# Patient Record
Sex: FEMALE | Race: WHITE | URBAN - METROPOLITAN AREA
[De-identification: names, ages, dates, MRNs, and addresses within clinical notes are randomized per-mention and may not be internally consistent; named-entity substitution may affect disease eponyms.]

---

## 2020-01-06 ENCOUNTER — Encounter (OUTPATIENT)
Dept: URBAN - METROPOLITAN AREA CLINIC 72 | Facility: CLINIC | Age: 55
End: 2020-01-06
Payer: COMMERCIAL

## 2020-01-06 PROCEDURE — 99204 OFFICE O/P NEW MOD 45 MIN: CPT | Performed by: OPTOMETRIST

## 2020-01-09 ENCOUNTER — Encounter (OUTPATIENT)
Dept: URBAN - METROPOLITAN AREA CLINIC 72 | Facility: CLINIC | Age: 55
End: 2020-01-09
Payer: COMMERCIAL

## 2020-01-09 PROCEDURE — 92012 INTRM OPH EXAM EST PATIENT: CPT | Performed by: OPTOMETRIST

## 2020-01-30 ENCOUNTER — Encounter (OUTPATIENT)
Dept: URBAN - METROPOLITAN AREA CLINIC 71 | Facility: CLINIC | Age: 55
End: 2020-01-30
Payer: COMMERCIAL

## 2020-01-30 PROCEDURE — 99214 OFFICE O/P EST MOD 30 MIN: CPT | Performed by: OPHTHALMOLOGY

## 2020-01-30 PROCEDURE — 92081 LIMITED VISUAL FIELD XM: CPT | Performed by: OPHTHALMOLOGY

## 2020-02-03 ENCOUNTER — Encounter (OUTPATIENT)
Dept: URBAN - METROPOLITAN AREA CLINIC 71 | Facility: CLINIC | Age: 55
End: 2020-02-03
Payer: COMMERCIAL

## 2020-02-03 PROCEDURE — 92081 LIMITED VISUAL FIELD XM: CPT | Performed by: OPHTHALMOLOGY

## 2020-04-23 ENCOUNTER — PRE-OPERATIVE VISIT (OUTPATIENT)
Dept: URBAN - METROPOLITAN AREA CLINIC 71 | Facility: CLINIC | Age: 55
End: 2020-04-23
Payer: COMMERCIAL

## 2020-04-23 DIAGNOSIS — H02.831 DERMATOCHALASIS OF RIGHT UPPER EYELID: Primary | ICD-10-CM

## 2020-04-23 DIAGNOSIS — H02.834 DERMATOCHALASIS OF LEFT UPPER EYELID: ICD-10-CM

## 2020-04-23 PROCEDURE — 92012 INTRM OPH EXAM EST PATIENT: CPT | Performed by: OPHTHALMOLOGY

## 2020-04-23 NOTE — IMPRESSION/PLAN
Impression: Dermatochalasis of right upper eyelid: H02.831. Plan: Discussed diagnosis in detail with patient. New medication(s) Rx given today. Surgical treatment is required. Patient elects to have surgery. Surgical risks and benefits were discussed, explained and understood by patient. Pre op instructions given and understood. Post op instructions given and understood. 
PROCEED WITH BLEPHAROPLASTY OU UL, XANAX, ERYTHROMYCIN OINT

## 2020-04-23 NOTE — IMPRESSION/PLAN
Impression: Dermatochalasis of left upper eyelid: H02.834. Plan: Discussed diagnosis in detail with patient. Surgical treatment is required. Patient elects to have surgery. Surgical risks and benefits were discussed, explained and understood by patient. Pre op instructions given and understood. Post op instructions given and understood. New medication(s) Rx given today. PROCEED WITH BLEPHAROPLSTY OU UL, XANAX, ERYTHROMYCIN OINT.

## 2020-05-19 ENCOUNTER — SURGERY (OUTPATIENT)
Dept: URBAN - METROPOLITAN AREA SURGERY 45 | Facility: SURGERY | Age: 55
End: 2020-05-19
Payer: COMMERCIAL

## 2020-05-19 ENCOUNTER — SURGERY (OUTPATIENT)
Dept: URBAN - METROPOLITAN AREA SURGERY 44 | Facility: SURGERY | Age: 55
End: 2020-05-19
Payer: COMMERCIAL

## 2020-05-27 ENCOUNTER — POST-OPERATIVE VISIT (OUTPATIENT)
Dept: URBAN - METROPOLITAN AREA CLINIC 71 | Facility: CLINIC | Age: 55
End: 2020-05-27

## 2020-05-27 DIAGNOSIS — Z98.890 OTHER SPECIFIED POSTPROCEDURAL STATES: Primary | ICD-10-CM

## 2020-05-27 PROCEDURE — 99024 POSTOP FOLLOW-UP VISIT: CPT | Performed by: OPHTHALMOLOGY

## 2020-05-27 ASSESSMENT — INTRAOCULAR PRESSURE
OD: 18
OS: 17

## 2020-05-27 NOTE — IMPRESSION/PLAN
Impression: S/P Belpharoplasty OU - 8 Days. Other specified postprocedural states  Z98.890. Minimal swelling Incision looks good. Healing well. Sutures removed today OU. Patient okay to d/c ointment. No hard rubbing for another week.  Plan:

## 2020-05-29 ENCOUNTER — POST-OPERATIVE VISIT (OUTPATIENT)
Dept: URBAN - METROPOLITAN AREA CLINIC 71 | Facility: CLINIC | Age: 55
End: 2020-05-29

## 2020-05-29 PROCEDURE — 99024 POSTOP FOLLOW-UP VISIT: CPT | Performed by: OPTOMETRIST

## 2020-05-29 ASSESSMENT — INTRAOCULAR PRESSURE
OS: 17
OD: 21

## 2020-05-29 NOTE — IMPRESSION/PLAN
Impression: Other specified postprocedural states: Z98.890.  Plan: Discussed diagnosis in detail with patient. looks good, pt is cleared to use vitamin E To improve the scar

## 2020-09-17 ENCOUNTER — OFFICE VISIT (OUTPATIENT)
Dept: URBAN - METROPOLITAN AREA CLINIC 71 | Facility: CLINIC | Age: 55
End: 2020-09-17
Payer: COMMERCIAL

## 2020-09-17 PROCEDURE — 99212 OFFICE O/P EST SF 10 MIN: CPT | Performed by: OPHTHALMOLOGY

## 2020-09-17 ASSESSMENT — INTRAOCULAR PRESSURE
OS: 15
OD: 16

## 2020-09-17 ASSESSMENT — KERATOMETRY
OS: 42.63
OD: 43.00

## 2020-09-17 NOTE — IMPRESSION/PLAN
Impression: Dermatochalasis of right upper eyelid: H02.831. S/P blepharoplasty. Plan: Pt informed that the purpose of the surgery is to improve the vision and some asymmetry may occur. Pt advised that a steroid injection may help the bumps resolve, but they should go away on their own. Pt advised of the HOF around the eyes and that this would be a cosmetic procedure.  Pt will wait to see if they resolve at this time, pt advised that if after about 12 months, they have not resolved, steroid injections can be an option

## 2021-07-01 ENCOUNTER — OFFICE VISIT (OUTPATIENT)
Dept: URBAN - METROPOLITAN AREA CLINIC 71 | Facility: CLINIC | Age: 56
End: 2021-07-01
Payer: COMMERCIAL

## 2021-07-01 DIAGNOSIS — H04.123 DRY EYE SYNDROME OF BILATERAL LACRIMAL GLANDS: ICD-10-CM

## 2021-07-01 PROCEDURE — 68761 CLOSE TEAR DUCT OPENING: CPT | Performed by: OPHTHALMOLOGY

## 2021-07-01 PROCEDURE — 99213 OFFICE O/P EST LOW 20 MIN: CPT | Performed by: OPHTHALMOLOGY

## 2021-07-01 ASSESSMENT — INTRAOCULAR PRESSURE
OS: 14
OD: 14

## 2021-07-01 NOTE — IMPRESSION/PLAN
Impression: Dermatochalasis of left upper eyelid: H02.834. Plan: residual fullness at far temporal edge of scars OU upper lid. Will make a small elliptical incision OU in clinic with a small stitch closer. Will RTC N/A for In-office procedure.

## 2021-07-01 NOTE — IMPRESSION/PLAN
Impression: Dry eye syndrome of bilateral lacrimal glands: H04.123. Plan: PT reports consistent gritty and montserrat feeling in the eyes. She has constant dry mouth. She reports she has a hard time swallowing the middle of eggs. Recommend PT have a Sjogren's panel done to rule out possible Sjogren's. Punctal plugs discussed with PT. PT consents to plugs and no complications during procedure.  
SOFTPLUG 0.4mm- [id number] OU

## 2021-09-09 ENCOUNTER — PROCEDURE (OUTPATIENT)
Dept: URBAN - METROPOLITAN AREA CLINIC 71 | Facility: CLINIC | Age: 56
End: 2021-09-09
Payer: COMMERCIAL

## 2021-09-09 DIAGNOSIS — D23.111 OTHER BENIGN NEOPLASM SKIN/ RIGHT UPPER EYELID, INC CANTHUS: Primary | ICD-10-CM

## 2021-09-09 DIAGNOSIS — D23.121 OTHER BENIGN NEOPLASM SKIN/ LEFT UPPER EYELID, INC CANTHUS: ICD-10-CM

## 2021-09-09 PROCEDURE — 11440 EXC FACE-MM B9+MARG 0.5 CM/<: CPT | Performed by: OPHTHALMOLOGY

## 2021-09-09 ASSESSMENT — INTRAOCULAR PRESSURE
OD: 18
OS: 14

## 2021-09-09 NOTE — IMPRESSION/PLAN
Impression: Other benign neoplasm skin/ right upper eyelid, inc canthus: D23.111. Sites numbed with topical lido and cleaned with alcohol. Lesions marked and areas injected with lido w/ epi. Lesion removed using [place]s and toothed forceps without any complications. Sites cauterized and erythromycin applied to areas. Pt tolerated the procedure well Plan: Discussed. R/B/A discussed with patient. Pt elects to proceed with removal of both lesions. Pt informed that the scabs will take a few weeks to heal and to contact the office with any complications.  Pt advised not to wash off the erythromycin until morning